# Patient Record
(demographics unavailable — no encounter records)

---

## 2024-10-09 NOTE — ASSESSMENT
[FreeTextEntry1] : REGINA is a 10 year old male with a left thumb injury sustained last week.    -We discussed the history, physical exam, and all available radiographs at length during today's visit with the patient and parent/guardian who served as an independent historian due to the child's age and unreliable nature of the history. -X-rays of the Left Thumb (3 views) were obtained and independently reviewed at today's office visit on 10/09/24: No fracture or dislocation noted.  -The etiology, pathoanatomy, treatment modalities, and expected natural history of the injury were discussed at length today. -Clinically, he is doing very well and has full ROM with no discomfort. -At this time, we recommend no further immobilization or orthopedic intervention. -Full clearance for activities, gym, sports, recess. School note provided at today's office visit.  -We will plan to see him back in the clinic on an as needed basis for this injury at this time or he may return if new concerns should arise.   All questions and concerns were addressed today. Parent and patient verbalize understanding and agree with the plan of care.   I, Florencia Figueroa PA-C, have acted as a scribe and documented the above information for Dr. Luna.

## 2024-10-09 NOTE — DATA REVIEWED
[de-identified] : X-rays of the Left Thumb (3 views) were obtained and independently reviewed at today's office visit on 10/09/24: No fracture or dislocation noted.

## 2024-10-09 NOTE — HISTORY OF PRESENT ILLNESS
[FreeTextEntry1] : Johnathan is a 10 year old, otherwise healthy, male presenting to the office with his father today for initial pediatric orthopedic evaluation of a left thumb injury. Patient was running at [employer] last week when he tripped and fell onto his outstretched hand. He had immediate pain about the left thumb after the fall. He was seen at [facility] where x-rays were obtained and no acute fracture was noted. He was placed in a finger splint and recommended for further orthopedic evaluation.  Today, he reports he is overall doing well with no pain or discomfort at this time. He self-discontinued the finger splint after 2 days. He is not requiring pain medications. He denies swelling or bruising. Denies numbness, tingling, radiating pain, or weakness of the LUE. Here for further orthopedic evaluation today.

## 2024-10-09 NOTE — REASON FOR VISIT
[Initial Evaluation] : an initial evaluation [Patient] : patient [Father] : father [FreeTextEntry1] : left thumb injury

## 2024-10-09 NOTE — PHYSICAL EXAM
[FreeTextEntry1] : Gait: Presents ambulating independently without signs of antalgia. Good coordination and balance noted. GENERAL: alert, cooperative, in NAD SKIN: The skin is intact, warm, pink and dry over the area examined. EYES: Normal conjunctiva, normal eyelids and pupils were equal and round. ENT: normal ears, normal nose and normal lips. CARDIOVASCULAR: brisk capillary refill, but no peripheral edema. RESPIRATORY: The patient is in no apparent respiratory distress. They're taking full deep breaths without use of accessory muscles or evidence of audible wheezes or stridor without the use of a stethoscope. Normal respiratory effort. ABDOMEN: not examined  Left Thumb: No edema, erythema, or bony deformities. Skin is intact with no signs of trauma. Nail bed intact. No tenderness along length of digit or remainder of hand. Full extension and flexion at the MCP, PIP, and DIP joints. The joints are all stable with stress maneuvers. Fingers are warm, pink, and moving freely. Sensation grossly intact in all digits AIN/ PIN/ U nerve function intact Brisk capillary refill distally.

## 2024-10-09 NOTE — DEVELOPMENTAL MILESTONES
[Roll Over: ___ Months] : Roll Over: [unfilled] months [Sit Up: ___ Months] : Sit Up: [unfilled] months [Pull Self to Stand ___ Months] : Pull self to stand: [unfilled] months [Walk ___ Months] : Walk: [unfilled] months [Right] : right [FreeTextEntry3] : no

## 2024-10-09 NOTE — BIRTH HISTORY
[Duration: ___ wks] : duration: [unfilled] weeks [] :  [___ lbs.] : [unfilled] lbs [___ oz.] : [unfilled] oz. [Was child in NICU?] : Child was in NICU [Normal?] : delivery not normal [FreeTextEntry7] : 1 week